# Patient Record
Sex: FEMALE | Race: BLACK OR AFRICAN AMERICAN
[De-identification: names, ages, dates, MRNs, and addresses within clinical notes are randomized per-mention and may not be internally consistent; named-entity substitution may affect disease eponyms.]

---

## 2018-01-21 ENCOUNTER — HOSPITAL ENCOUNTER (EMERGENCY)
Dept: HOSPITAL 72 - EMR | Age: 28
Discharge: HOME | End: 2018-01-21
Payer: MEDICAID

## 2018-01-21 VITALS — SYSTOLIC BLOOD PRESSURE: 115 MMHG | DIASTOLIC BLOOD PRESSURE: 77 MMHG

## 2018-01-21 VITALS — DIASTOLIC BLOOD PRESSURE: 77 MMHG | SYSTOLIC BLOOD PRESSURE: 115 MMHG

## 2018-01-21 VITALS — HEIGHT: 66 IN | BODY MASS INDEX: 22.5 KG/M2 | WEIGHT: 140 LBS

## 2018-01-21 DIAGNOSIS — N39.0: Primary | ICD-10-CM

## 2018-01-21 DIAGNOSIS — Z32.01: ICD-10-CM

## 2018-01-21 DIAGNOSIS — R19.7: ICD-10-CM

## 2018-01-21 DIAGNOSIS — Z88.6: ICD-10-CM

## 2018-01-21 LAB
APPEARANCE UR: (no result)
APTT PPP: YELLOW S
GLUCOSE UR STRIP-MCNC: NEGATIVE MG/DL
KETONES UR QL STRIP: (no result)
LEUKOCYTE ESTERASE UR QL STRIP: (no result)
NITRITE UR QL STRIP: NEGATIVE
PH UR STRIP: 6.5 [PH] (ref 4.5–8)
PROT UR QL STRIP: (no result)
SP GR UR STRIP: 1.02 (ref 1–1.03)
UROBILINOGEN UR-MCNC: 4 MG/DL (ref 0–1)

## 2018-01-21 PROCEDURE — 36415 COLL VENOUS BLD VENIPUNCTURE: CPT

## 2018-01-21 PROCEDURE — 96361 HYDRATE IV INFUSION ADD-ON: CPT

## 2018-01-21 PROCEDURE — 81025 URINE PREGNANCY TEST: CPT

## 2018-01-21 PROCEDURE — 81003 URINALYSIS AUTO W/O SCOPE: CPT

## 2018-01-21 PROCEDURE — 96365 THER/PROPH/DIAG IV INF INIT: CPT

## 2018-01-21 PROCEDURE — 87086 URINE CULTURE/COLONY COUNT: CPT

## 2018-01-21 PROCEDURE — 86901 BLOOD TYPING SEROLOGIC RH(D): CPT

## 2018-01-21 PROCEDURE — 86900 BLOOD TYPING SEROLOGIC ABO: CPT

## 2018-01-21 PROCEDURE — 99284 EMERGENCY DEPT VISIT MOD MDM: CPT

## 2018-01-21 NOTE — EMERGENCY ROOM REPORT
History of Present Illness


General


Chief Complaint:  Flu Like Symptoms


Source:  Patient


 (Eyal Washington)





Present Illness


HPI


26 yo female presents to ER for flulike symptoms and generalized abdominal 

pain. Patient complains of watery diarrhea for the past week. Patient also 

complains of vomiting with food intake x1 day. States she feels nauseous and 

has been unable to eat food during this time.


Patient also complains of subjective fever, congestion.


Patient denies breathing difficulty, no SOB. Patient states she took 

antibiotics (unknown what antibiotic) for her symptoms.


Patient LMP was . Patient states she has not received her 

menses this month. Patient complains of blood spotting on her tampon. Patient 

denies profuse vaginal discharge or bleeding. Patient denies pain with 

urination or difficulty urinating. Patient states she is sexually active with 

her boyfriend, does not use contraceptive methods.


Patient reports she was pregnant once before and elected to have an .


Patient denies blood in stool or vomit.


Patient denies chest pain, SOB, rash.


 (Eyal Washington)


Allergies:  


Coded Allergies:  


     ACETAMINOPHEN (Verified  Allergy, Unknown, 16)


     HYDROCODONE (Verified  Allergy, Unknown, 16)





Patient History


Past Medical History:  see triage record


Social History:  Denies: smoking, alcohol use, drug use


Last Menstrual Period:  18


:  1


Para:  0 - elevtive 


Reviewed Nursing Documentation:  PMH: Agreed, PSxH: Agreed (Eyal Washington)





Nursing Documentation-PMH


Past Medical History:  No Stated History


 (Eyal Washington)





Review of Systems


All Other Systems:  negative except mentioned in HPI


 (Eyal Washington)





Physical Exam





Vital Signs








  Date Time  Temp Pulse Resp B/P (MAP) Pulse Ox O2 Delivery O2 Flow Rate FiO2


 


18 18:07 97.7 63 18 110/76 98 Room Air  








Sp02 EP Interpretation:  reviewed, normal


General Appearance:  alert, GCS 15, non-toxic, mild distress


Head:  normocephalic, atraumatic


Eyes:  bilateral eye normal inspection, bilateral eye PERRL


ENT:  hearing grossly normal, normal pharynx, no angioedema, normal voice, TMs 

+ canals normal, uvula midline, moist mucus membranes


Respiratory:  chest non-tender, lungs clear, normal breath sounds, speaking 

full sentences


Cardiovascular #1:  regular rate, rhythm, no edema


Gastrointestinal:  soft, no mass, non-distended, no guarding, no rebound, 

tenderness - diffuse abdominal tenderness, unable to localize pain


Genitourinary:  normal inspection, no CVA tenderness - right, CVA tenderness (L)


Musculoskeletal:  back normal, digits/nails normal, gait/station normal, normal 

range of motion, non-tender


Neurologic:  alert, oriented x3, responsive, motor strength/tone normal, 

sensory intact, speech normal


Psychiatric:  mood/affect normal


Skin:  normal color, no rash, warm/dry, well hydrated


Lymphatic:  no adenopathy


 (Eyal Washington P.AShelby)





Medical Decision Making


PA Attestation


Dr. Hill  is my supervising Physician whom patient management has been 

discussed with.


 (Eyal Washington P.AShelby)


Diagnostic Impression:  


 Primary Impression:  


 Positive pregnancy test


 Additional Impressions:  


 Diarrhea


 Qualified Codes:  R19.7 - Diarrhea, unspecified


 UTI (urinary tract infection)


 Qualified Codes:  N30.00 - Acute cystitis without hematuria


ER Course


Pt presents to ED c/o generalized abdominal pain, vomiting, and diarrhea.





DDX considered but are not limited to gastroenteritis, viral infection, UTI, 

pregnancy.





VITAL SIGNS are WNL, patient is afebrile.





ORDERS: 


UA with reflux, shows positive leukocyte esterase and bacteria.


Urine pregnancy, positive.


Lab results discussed with patient. Patient states she understands results.





Following results of pregnancy test, Type ABO/Rh were ordered. Patient is A 

positive. Patient informed of results.





ED INTERVENTIONS:


Zofran


IV NS





ER COURSE


Patient is resting comfortably in bed, hemodynamically stable in no acute 

distress. Patient states she feels better and is ready to go home.


Patient will be discharged to home following completion of IV NS.


Results and treatment discussed with Dr. Hill who will discharge patient 

following IV NS.


 


-Rx provided for Macrobid for UTI.


-Rx provided for Compazine for nausea.





Explained to patient that vomiting symptoms may be due to morning sickness 

related to pregnancy. 


Will provide with patient care instructions and any necessary prescriptions.


Patient to take medication as instructed.


Care plan and follow-up instructions provided.


Patient questions asked and answered.


Patient instructed to follow-up with OBGYN in 3 - 5 days for further treatment.


ER precautions given. Patient instructed to return to ER immediately for any 

new or worsening of symptoms.





Labs








Test


  18


19:06


 


Urine Color Yellow 


 


Urine Appearance


  Slightly


cloudy


 


Urine pH 6.5 (4.5-8.0) 


 


Urine Specific Gravity


  1.020


(1.005-1.035)


 


Urine Protein 2+ (NEGATIVE) 


 


Urine Glucose (UA)


  Negative


(NEGATIVE)


 


Urine Ketones 2+ (NEGATIVE) 


 


Urine Occult Blood


  Negative


(NEGATIVE)


 


Urine Nitrite


  Negative


(NEGATIVE)


 


Urine Bilirubin 1+ (NEGATIVE) 


 


Urine Ictotest Negative 


 


Urine Urobilinogen


  4 MG/DL


(0.0-1.0)


 


Urine Leukocyte Esterase 1+ (NEGATIVE) 


 


Urine RBC


  0-2 /HPF (0 -


2)


 


Urine WBC


  5-10 /HPF (0 -


2)


 


Urine Squamous Epithelial


Cells Moderate /LPF


(NONE/OCC)


 


Urine Bacteria


  Moderate /HPF


(NONE)


 


Urine HCG, Qualitative Positive 








 (Eyal Washington)


ER Course


Patient examined by me.  Agree with treatment plan.  She is improved after IV 

hydration and medication.  She's tolerating by mouth.  The patient is stable 

for outpatient observation and treatment.


 (Daniel Hill M.D.)





Last Vital Signs








  Date Time  Temp Pulse Resp B/P (MAP) Pulse Ox O2 Delivery O2 Flow Rate FiO2


 


18 18:07 97.7 63 18 110/76 98 Room Air  








Status:  improved


 (Eyal Washington)


Disposition:  HOME, SELF-CARE


Condition:  Stable


Scripts


Prochlorperazine (COMPAZINE*) 10 Mg Tablet


10 MG ORAL Q6H Y for Nausea & Vomiting for 7 Days, #24 TAB


   Prov: Eyal Washington         18 


Nitrofurantoin Monohyd/M-Cryst* (MACROBID 100 MG*) 100 Mg Capsule


100 MG ORAL EVERY 12 HOURS for 7 Days, #14 CAP


   Prov: Eyal Washington         18


Referrals:  


NOT CHOSEN IPA/MD,REFERRING (PCP)


Patient Instructions:  Abdominal Pain During Pregnancy, Easy-to-Read, Diarrhea, 

Adult, Easy-to-Read, Urinary Tract Infection, Easy-to-Read





Additional Instructions:  


Followup with OBGYN and Primary Care Provider in 3 -5 days.


Take medications as directed. 


Patient questions asked and answered.


ER precautions given, patient instructed to return to ER immediately for any 

new or worsening of symptoms.











Eyal Washington 2018 18:56


Daniel Hill M.D. 2018 10:21